# Patient Record
Sex: MALE | Race: WHITE
[De-identification: names, ages, dates, MRNs, and addresses within clinical notes are randomized per-mention and may not be internally consistent; named-entity substitution may affect disease eponyms.]

---

## 2017-09-28 ENCOUNTER — HOSPITAL ENCOUNTER (OUTPATIENT)
Dept: HOSPITAL 61 - SURG | Age: 47
Discharge: HOME | End: 2017-09-28
Attending: SURGERY
Payer: COMMERCIAL

## 2017-09-28 DIAGNOSIS — I10: ICD-10-CM

## 2017-09-28 DIAGNOSIS — D17.22: ICD-10-CM

## 2017-09-28 DIAGNOSIS — Z87.39: ICD-10-CM

## 2017-09-28 DIAGNOSIS — D17.24: Primary | ICD-10-CM

## 2017-09-28 PROCEDURE — 27337 EXC THIGH/KNEE LES SC 3 CM/>: CPT

## 2017-09-28 PROCEDURE — 88304 TISSUE EXAM BY PATHOLOGIST: CPT

## 2017-09-28 PROCEDURE — 24071 EXC ARM/ELBOW LES SC 3 CM/>: CPT

## 2017-09-28 NOTE — DISCH
DISCHARGE INSTRUCTIONS


Condition on Discharge


Condition on Discharge:  Stable





Activity After Discharge


Activity Instructions for Disc:  Activity as tolerated





Diet after Discharge


Diet after Discharge:  Regular





Wound Incision Care


Other wound/incision instructi:  May shower in 24 hours





Contacting the  after DC


Call your doctor for:  If your condition worsens





Follow-Up


Follow up with:  Dr Villagran in 2 weeks











LESIA VILLAGRAN MD Sep 28, 2017 13:59

## 2017-09-28 NOTE — PDOC4
Operative Note


Operative Note


Date: 9/28/2017


Preoperative diagnosis: Lipomas


Postoperative diagnosis: Same


Procedure: Excision of lipomas x3


Surgeon: Brian


Specimen: 3 lipomas


Dictation: Patient is a 47-year-old male was complaints of subcutaneous masses 

one on the left upper arm and the left thigh area 2. Seizure of excision was 

explained to the patient in detail was benefits were also discussed including 

bleeding infection alternatives to this procedure also discussed with the 

patient seemed understanding gave both verbal and written consent to have the 

procedure performed. He was taken to the minor was room placed in supine 

position the area on his thigh and arm were prepped and draped usual sterile 

fashion using ChloraPrep 1% lidocaine with epinephrine was injected over each 

spot until anesthetized. Using a 15 blade scalpel incision was made over the 

mass on each spot the lipoma was excised sharply and sent for pathology the 

wounds were then closed in 2 layers a deep layer running 3-0 Vicryl the skin 

was approximate 4 septic Monocryl Mastisol Steri-Strips and island dressings 

were applied. He did well was discharged home in stable condition all sponge 

instrument needle counts listed as correct estimated blood loss 5 mL.











LESIA VILLAGRAN MD Sep 28, 2017 13:58

## 2017-09-30 NOTE — PATHOLOGY
PATHOLOGY REPORT 

 

*    *    *    *    *    *    *    * 

 FINAL DIAGNOSIS:

A.  Soft tissue, left lateral leg, removal:

- Lipoma.

B.  Soft tissue, "left leg medial", removal:

- Lipoma.

C.  Soft tissue, left elbow, removal:

- Lipoma.

(SK:darell; 2017) 

 

 

REPORT ELECTRONICALLY SIGNED BY:   MATTHEW Schreiber M.D.

DATE/TIME:   2017 15:11

*    *    *    *    *    *    *    *

 

GROSS PATHOLOGY:

A. Received in formalin labeled "Rakesh Church, left leg lateral

lipoma" is an encapsulated yellow-tan mass measuring 4.6 x 3.1 x 1.5

cm.  The external surface is inked black.  The specimen is serially

sectioned to reveal no hemorrhage or necrosis.  Representative

sections are submitted in cassette A1.

B. Received in formalin labeled "Rakesh Church, left leg medial

lipoma" is an encapsulated yellow-tan mass measuring 2.5 x 2.5 x 1.5

cm.  The external surface is inked black.  The specimen is serially

sectioned to reveal no hemorrhage or necrosis.  Representative

sections are submitted in cassette B1.

C. Received in formalin labeled "Rakesh Church, left elbow lipoma"

are two encapsulated yellow-tan masses measuring 3.0 x 2.1 x 1.1 cm

and 2.5 x 2.1 x 1.1 cm.  The larger mass is inked black and the

smaller mass is inked blue.  The masses are early sectioned to reveal

no hemorrhage or necrosis.  Representative sections of both masses

are submitted in cassette C1.

[place] [place]

 

 

 INITIAL CPT CODE(S):

A; 84613

B; 92658

C; 51335

Professional services performed by [facility] at 

[address] [address]

 

  Technical services performed by [facility] at [address] [place] [address], [address]

[address], [place] [address].

  

 SPECIMEN(S) RECEIVED:

A.Left leg lateral lipoma

B.Left leg medial lipoma

C.Left elbow lipoma

 

 CLINICAL HISTORY:

Lipoma left upper forearm and left upper thigh

 

 PATIENT:  RAKESH CHURCH

/AGE:  1970 (Age: 47)  

PATIENT #:  [id number]

ACCESSION #:  [id number]          ALT CASE #:   

SPECIMEN COLLECTION DATE:  2017

SPECIMEN RECEIVED DATE:  2017

   

[facility] - [id number] [address] - PHONE: 

[phone]

* * *  END OF REPORT  * * *

## 2018-06-07 ENCOUNTER — HOSPITAL ENCOUNTER (OUTPATIENT)
Dept: HOSPITAL 61 - KCIC | Age: 48
Discharge: HOME | End: 2018-06-07
Attending: PHYSICIAN ASSISTANT
Payer: COMMERCIAL
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
## 2018-06-07 DIAGNOSIS — S89.92XA: Primary | ICD-10-CM
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
## 2018-06-07 DIAGNOSIS — M23.8X2: ICD-10-CM
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
## 2018-06-07 PROCEDURE — 73562 X-RAY EXAM OF KNEE 3: CPT

## 2018-06-12 ENCOUNTER — HOSPITAL ENCOUNTER (OUTPATIENT)
Dept: HOSPITAL 61 - KCIC MRI | Age: 48
Discharge: HOME | End: 2018-06-12
Attending: PHYSICIAN ASSISTANT
Payer: COMMERCIAL
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
## 2018-06-12 DIAGNOSIS — S83.242A: Primary | ICD-10-CM
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
## 2018-06-12 DIAGNOSIS — M17.12: ICD-10-CM

## 2018-06-12 DIAGNOSIS — M71.22: ICD-10-CM
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
## 2018-06-12 PROCEDURE — 73721 MRI JNT OF LWR EXTRE W/O DYE: CPT

## 2020-11-24 ENCOUNTER — HOSPITAL ENCOUNTER (EMERGENCY)
Dept: HOSPITAL 61 - ER | Age: 50
Discharge: HOME | End: 2020-11-24
Payer: COMMERCIAL

## 2020-11-24 VITALS — HEIGHT: 69 IN | WEIGHT: 210.32 LBS | BODY MASS INDEX: 31.15 KG/M2

## 2020-11-24 VITALS — DIASTOLIC BLOOD PRESSURE: 87 MMHG | SYSTOLIC BLOOD PRESSURE: 147 MMHG

## 2020-11-24 DIAGNOSIS — R06.02: ICD-10-CM

## 2020-11-24 DIAGNOSIS — I10: ICD-10-CM

## 2020-11-24 DIAGNOSIS — U07.1: Primary | ICD-10-CM

## 2020-11-24 DIAGNOSIS — R50.9: ICD-10-CM

## 2020-11-24 DIAGNOSIS — R05: ICD-10-CM

## 2020-11-24 LAB
ALBUMIN SERPL-MCNC: 3.4 G/DL (ref 3.4–5)
ALBUMIN/GLOB SERPL: 0.9 {RATIO} (ref 1–1.7)
ALP SERPL-CCNC: 96 U/L (ref 46–116)
ALT SERPL-CCNC: 54 U/L (ref 16–63)
ANION GAP SERPL CALC-SCNC: 11 MMOL/L (ref 6–14)
AST SERPL-CCNC: 44 U/L (ref 15–37)
BASOPHILS # BLD AUTO: 0 X10^3/UL (ref 0–0.2)
BASOPHILS NFR BLD: 1 % (ref 0–3)
BILIRUB SERPL-MCNC: 0.5 MG/DL (ref 0.2–1)
BUN SERPL-MCNC: 13 MG/DL (ref 8–26)
BUN/CREAT SERPL: 12 (ref 6–20)
CALCIUM SERPL-MCNC: 8.8 MG/DL (ref 8.5–10.1)
CHLORIDE SERPL-SCNC: 100 MMOL/L (ref 98–107)
CO2 SERPL-SCNC: 25 MMOL/L (ref 21–32)
CREAT SERPL-MCNC: 1.1 MG/DL (ref 0.7–1.3)
EOSINOPHIL NFR BLD: 0 % (ref 0–3)
EOSINOPHIL NFR BLD: 0 X10^3/UL (ref 0–0.7)
ERYTHROCYTE [DISTWIDTH] IN BLOOD BY AUTOMATED COUNT: 13.4 % (ref 11.5–14.5)
GFR SERPLBLD BASED ON 1.73 SQ M-ARVRAT: 71.1 ML/MIN
GLUCOSE SERPL-MCNC: 105 MG/DL (ref 70–99)
HCT VFR BLD CALC: 45.4 % (ref 39–53)
HGB BLD-MCNC: 16.2 G/DL (ref 13–17.5)
LYMPHOCYTES # BLD: 0.7 X10^3/UL (ref 1–4.8)
LYMPHOCYTES NFR BLD AUTO: 16 % (ref 24–48)
MAGNESIUM SERPL-MCNC: 1.8 MG/DL (ref 1.8–2.4)
MCH RBC QN AUTO: 30 PG (ref 25–35)
MCHC RBC AUTO-ENTMCNC: 36 G/DL (ref 31–37)
MCV RBC AUTO: 83 FL (ref 79–100)
MONO #: 0.5 X10^3/UL (ref 0–1.1)
MONOCYTES NFR BLD: 10 % (ref 0–9)
NEUT #: 3.5 X10^3/UL (ref 1.8–7.7)
NEUTROPHILS NFR BLD AUTO: 74 % (ref 31–73)
PLATELET # BLD AUTO: 166 X10^3/UL (ref 140–400)
POTASSIUM SERPL-SCNC: 3.9 MMOL/L (ref 3.5–5.1)
PROT SERPL-MCNC: 7.1 G/DL (ref 6.4–8.2)
RBC # BLD AUTO: 5.48 X10^6/UL (ref 4.3–5.7)
SODIUM SERPL-SCNC: 136 MMOL/L (ref 136–145)
WBC # BLD AUTO: 4.8 X10^3/UL (ref 4–11)

## 2020-11-24 PROCEDURE — 83880 ASSAY OF NATRIURETIC PEPTIDE: CPT

## 2020-11-24 PROCEDURE — 84484 ASSAY OF TROPONIN QUANT: CPT

## 2020-11-24 PROCEDURE — 85025 COMPLETE CBC W/AUTO DIFF WBC: CPT

## 2020-11-24 PROCEDURE — 80053 COMPREHEN METABOLIC PANEL: CPT

## 2020-11-24 PROCEDURE — 36415 COLL VENOUS BLD VENIPUNCTURE: CPT

## 2020-11-24 PROCEDURE — 96374 THER/PROPH/DIAG INJ IV PUSH: CPT

## 2020-11-24 PROCEDURE — 99285 EMERGENCY DEPT VISIT HI MDM: CPT

## 2020-11-24 PROCEDURE — 71045 X-RAY EXAM CHEST 1 VIEW: CPT

## 2020-11-24 PROCEDURE — 96361 HYDRATE IV INFUSION ADD-ON: CPT

## 2020-11-24 PROCEDURE — 93005 ELECTROCARDIOGRAM TRACING: CPT

## 2020-11-24 PROCEDURE — 83735 ASSAY OF MAGNESIUM: CPT

## 2020-11-24 NOTE — PHYS DOC
General Adult


EDM:


Chief Complaint:  SHORTNESS OF BREATH





HPI:


HPI:





Patient is a 50  year old male who has history hypertension asthma, was brought 

here by EMS from home due to trouble breathing and cough.  Patient was diagnosed

with COVID-19 infection 9 days ago.  Patient has a positive rapid test.  He has 

been staying home quarantine.  Patient says he has been getting more short of 

air with exertion.  He has a portable oxygenation measurement device at home and

it measured between 92% -95 % on RA.  His wife called his physician assistant 

who advised her to call EMS to take him to the hospital.  EMS said his oxygen 

saturation was 94% on RA on their device.  Patient denied any  headache, no 

chest pain, no abdominal pain, no nausea or vomiting.  Patient denied any 

history of heart disease, no blood clot disorder, no history of diabetic. 


 (BLANK GARCIA DO)





Review of Systems:


Review of Systems:


Constitutional:   Denies fever or chills. []


Eyes:   Denies change in visual acuity. []


HENT:   Denies nasal congestion or sore throat. [] 


Respiratory:   Positive for nonproductive cough and shortness of breath. [] 


Cardiovascular:   Denies chest pain or edema. [] 


GI:   Denies abdominal pain, nausea, vomiting, bloody stools or diarrhea. [] 


:  Denies dysuria. [] 


Musculoskeletal:   Denies back pain or joint pain. [] 


Integument:   Denies rash. [] 


Neurologic:   Denies headache, focal weakness or sensory changes. [] 


Endocrine:   Denies polyuria or polydipsia. [] 


Lymphatic:  Denies swollen glands. [] 


Psychiatric:  Denies depression or anxiety. []


 (BLANK GARCIA DO)





Heart Score:


Risk Factors:


Risk Factors:  DM, Current or recent (<one month) smoker, HTN, HLP, family 

history of CAD, obesity.


Risk Scores:


Score 0 - 3:  2.5% MACE over next 6 weeks - Discharge Home


Score 4 - 6:  20.3% MACE over next 6 weeks - Admit for Clinical Observation


Score 7 - 10:  72.7% MACE over next 6 weeks - Early Invasive Strategies


 (BLANK GARCIA DO)





Current Medications:





Current Medications








 Medications


  (Trade)  Dose


 Ordered  Sig/David  Start Time


 Stop Time Status Last Admin


Dose Admin


 


 Methylprednisolone


 Sodium Succinate


  (SOLU-Medrol


 125MG VIAL)  125 mg  1X  ONCE  11/24/20 17:45


 11/24/20 17:46   





 


 Sodium Chloride  1,000 ml @ 


 1,000 mls/hr  1X  ONCE  11/24/20 17:45


 11/24/20 18:44 UNV  











 (GARCIA,PETER T DO)





Allergies:


Allergies:





Allergies








Coded Allergies Type Severity Reaction Last Updated Verified


 


  No Known Drug Allergies    9/28/17 No








 (BLANK GARCIA DO)





Physical Exam:


PE:





Constitutional: Well developed, well nourished, no acute distress, non-toxic 

appearance. []


HENT: Normocephalic, atraumatic, bilateral external ears normal, oropharynx 

moist, no oral exudates, nose normal. []


Eyes: PERRLA, EOMI, conjunctiva normal, no discharge. [] 


Neck: Normal range of motion, no tenderness, supple, no stridor. [] 


Cardiovascular:Heart rate regular rhythm, no murmur []


Lungs & Thorax:  some mild expiratory wheezing, some crackles in middle lobes, 

no respiratory distress, no tachypnia. 


Abdomen: Bowel sounds normal, soft, no tenderness, no masses, no pulsatile 

masses. [] 


Skin: Warm, dry, no erythema, no rash. [] 


Back: No tenderness, no CVA tenderness. [] 


Extremities: No tenderness, no cyanosis, no clubbing, ROM intact, no edema. [] 


Neurologic: Alert and oriented X 3, normal motor function, normal sensory 

function, no focal deficits noted. []


Psychologic: Affect normal, judgement normal, mood normal. []


 (BLANK GARCIA DO)





EKG:


EKG:


[]


 (BLANK GARCIA DO)


EKG:


EKG 1800HRS


RATE 85


SINUS RHTYHM


NO LIVAN STD NO ACUTE MI


 (KAM MARCELO DO)


Radiology/Procedures:


Radiology/Procedures:


[]


 (BLANK GARCIA DO)


Impression:


Findings:


 


The cardiomediastinal silhouette is normal. The pulmonary vasculature is 


normal. There is vague reticular opacities in the peripheral lungs.


 


Impression:  


 


Minimal pulmonary infiltrates could be early atypical pneumonia.


 


Electronically signed by: Abdoulaye Ervin III, MD (11/24/2020 6:44 PM) 


[facility]


 (KAM MARCELO DO)


Course & Med Decision Making:


Course & Med Decision Making


Pertinent Labs and Imaging studies reviewed. (See chart for details)





Patient is a 50-year-old male who is currently infected with COVID-19 resented 

to ER due to trouble breathing.  Patient was in no acute distress, his oxygen 

saturation was 95% on room air.  Will obtain basic lab work, chest x-ray.  Will 

give patient saline and steroid IV.  Patient care was endorsed to incoming 

physician at shift change Dr. Kam Marcelo.  


 (BLANK GARCIA DO)


Course & Med Decision Making


Assumed care at shift change disposition pending labs radiologic imaging and 

reevaluation.  Chest x-ray consistent with Covid.  Labs without acute 

abnormalities.  I reviewed the results and discussed them with the patient.  

Patient's oxygen saturation 94-95% on room air.  Patient received IV fluids and 

steroids.  Patient we discharged home with Zithromax.  Patient instructed to t

chante Tylenol ibuprofen as needed for pain or fever.  Patient encouraged to 

increase p.o. intake.  When discussing disposition home-- answered all patients 

questions.


 (KAM MARCELO DO)


Dragon Disclaimer:


Dragon Disclaimer:


This electronic medical record was generated, in whole or in part, using a voice

 recognition dictation system.


 (BLANK GARCIA DO)





Departure


Departure


Impression:  


   Primary Impression:  


   COVID-19 virus infection


Disposition:  01 DC HOME SELF CARE/HOMELESS


Condition:  STABLE


Referrals:  


ANASTACIO NICOLAS MD (PCP)


Patient Instructions:  Viral Infections





Additional Instructions:  


You have been tested for or diagnosed with COVID-19. It is an infection caused 

by a new type 


of coronavirus. COVID-19 will cause cold-like or mild flu symptoms in most. It 

can cause 


more severe symptoms like problems breathing in some.





There is no treatment for COVID-19. The body will clear the infection over time.

 Self-care 


will help to ease discomfort.





Steps to Take:


Self-Care


Rest as needed. Healthy habits may help you feel better. Steps include:





Choose healthy foods including fruits and vegetables. Drink water throughout the

 day.


Get plenty of sleep each night.


If you smoke, try to quit. It may ease breathing.


Avoid alcohol.


Keep Others Healthy


The virus can spread to others. Droplets are released every time you sneeze or 

cough. The 


droplets can get into the mouth, nose, or eyes of people near you and lead to 

infection. To 


lower the chances of spreading COVID-19 to others:





Stay at home until your doctor has said it is safe to leave. If you tested 

positive this 


will mean staying isolated until both of the following are true:





At least 7 days have passed since the start of illness.


You are free of fever for at least 72 hours without the use of medicine.


During this time:





 - Avoid public areas, events, or transportation. Do not return to work or 

school until your 


doctor has said it is safe to do so.


 - Call ahead if you need to go to a medical center. Let them know you may have 

COVID-19. It 


will help them guide you where to go. They may also ask you to wear a facemask 

when you come 


to the office.


 - If you call for emergency medical services, let them know you may have COVID-

19.


While at home:





 - Try to avoid close contact with others. Stay about 6 feet away.


 - If possible, spend most of your time in a separate room from others.


 - Use a face mask if you will be in close contact with others such as sharing a

 room or 


vehicle.


 - Have someone wipe down common surfaces in the home. Use household  

every day on 


areas like doorknobs, counters, or sinks.


 - Cough or sneeze into a tissue. Throw the tissue away right after use. If a 

tissue is not 


available, cough or sneeze into your elbow.


 - Wash your hands often. Wash them after sneezing or coughing. Use soap and 

water and wash 


for at least 20 seconds. Alcohol based hand  can be used if soap and 

water is not 


available.


 - Do not prepare food for others. Avoid sharing personal items like forks, 

spoons, or 


toothbrushes.


 - Avoid close contact with pets while you are sick. There is no evidence of the

 virus 


passing to pets. This is a safety step until more is known about this virus.


Isolation can be frustrating. Social interaction can help. Keep in touch with 

friends and 


family through phone and tech options. You can still interact with others in 

your home, just 


keep a safe distance of about 6 feet.





Follow-up:


Your doctors office will check in with you to see if there are any changes in 

your health. 


You may be asked to keep track of symptoms to share with them. They will also 

let you know 


when you are clear to be in public again.





Problems to Look Out For:


Contact your doctor if your recovery is not going as you expect. Get emergency 

care if you 


have problems such as:





 - Trouble breathing


 - Nonstop chest pain or pressure


 - Changes in awareness, confusion, or problems waking


 - Lips or face have bluish color


 - Worsening of symptoms


If you think you have an emergency, call for emergency medical services right 

away.





As taken from [facility]


Scripts


Azithromycin (ZITHROMAX) 250 Mg Tablet


1 PKG PO UD, #6 TAB


   Prov: KAM MARCELO I DO         11/24/20











BLANK GARCIA DO                Nov 24, 2020 17:49


KAM MARCELO I DO            Nov 24, 2020 18:49

## 2020-11-24 NOTE — RAD
CHEST AP ONLY

 

Clinical History: Reason: soa, COVID INFECTION  12 / Spl. Instructions:  /

History: 

 

Technique:  AP view of the chest was obtained at 11/24/2020 5:39 PM.

 

Comparison: None.

 

Findings:

 

The cardiomediastinal silhouette is normal. The pulmonary vasculature is 

normal. There is vague reticular opacities in the peripheral lungs.

 

Impression:  

 

Minimal pulmonary infiltrates could be early atypical pneumonia.

 

Electronically signed by: Abdoulaye Ervin III, MD (11/24/2020 6:44 PM) 

[facility]

## 2020-11-24 NOTE — EKG
[facility]

              [address] [place] [address]

Test Date:    2020               Test Time:    18:00:40

Pat Name:     AMANDA CHURCH           Department:   

Patient ID:   [id number]           Room:          

Gender:       M                        Technician:   

:          1970               Requested By: BLANK GARCIA

Order Number: [id number]           Reading MD:     

                                 Measurements

Intervals                              Axis          

Rate:         85                       P:            42

SD:           160                      QRS:          4

QRSD:         90                       T:            16

QT:           336                                    

QTc:          405                                    

                           Interpretive Statements

SINUS RHYTHM

R-S TRANSITION ZONE IN V LEADS DISPLACED TO THE LEFT

S1,S2,S3 PATTERN

QRS(T) CONTOUR ABNORMALITY

CONSIDER ANTEROLATERAL MYOCARDIAL DAMAGE

POSSIBLY ABNORMAL ECG

RI6.01

Compared to ECG 2020 17:59:32

No significant changes